# Patient Record
Sex: FEMALE | Race: WHITE | NOT HISPANIC OR LATINO
[De-identification: names, ages, dates, MRNs, and addresses within clinical notes are randomized per-mention and may not be internally consistent; named-entity substitution may affect disease eponyms.]

---

## 2024-11-07 ENCOUNTER — DASHBOARD ENCOUNTERS (OUTPATIENT)
Age: 74
End: 2024-11-07

## 2024-11-07 ENCOUNTER — TELEPHONE ENCOUNTER (OUTPATIENT)
Dept: URBAN - METROPOLITAN AREA CLINIC 46 | Facility: CLINIC | Age: 74
End: 2024-11-07

## 2024-11-07 ENCOUNTER — OFFICE VISIT (OUTPATIENT)
Dept: URBAN - METROPOLITAN AREA CLINIC 48 | Facility: CLINIC | Age: 74
End: 2024-11-07
Payer: MEDICARE

## 2024-11-07 VITALS
BODY MASS INDEX: 23.99 KG/M2 | WEIGHT: 119 LBS | TEMPERATURE: 98.1 F | HEIGHT: 59 IN | HEART RATE: 76 BPM | SYSTOLIC BLOOD PRESSURE: 115 MMHG | DIASTOLIC BLOOD PRESSURE: 73 MMHG

## 2024-11-07 DIAGNOSIS — D50.0 IRON DEFICIENCY ANEMIA DUE TO CHRONIC BLOOD LOSS: ICD-10-CM

## 2024-11-07 PROBLEM — 724556004: Status: ACTIVE | Noted: 2024-11-07

## 2024-11-07 PROCEDURE — 99204 OFFICE O/P NEW MOD 45 MIN: CPT | Performed by: PHYSICIAN ASSISTANT

## 2024-11-07 RX ORDER — ATORVASTATIN CALCIUM 20 MG/1
1 TABLET TABLET, FILM COATED ORAL ONCE A DAY
Qty: 30 TABLET | Status: ACTIVE | COMMUNITY

## 2024-11-07 RX ORDER — CARBIDOPA AND LEVODOPA 50; 200 MG/1; MG/1
1 TABLET AS NEEDED TABLET, EXTENDED RELEASE ORAL
Status: ACTIVE | COMMUNITY

## 2024-11-07 RX ORDER — FUROSEMIDE 40 MG/1
1 TABLET TABLET ORAL ONCE A DAY
Qty: 30 TABLET | Refills: 1 | Status: ACTIVE | COMMUNITY

## 2024-11-07 RX ORDER — POTASSIUM CHLORIDE EXTENDED-RELEASE 1500 MG/1
1 TABLET WITH FOOD TABLET ORAL ONCE A DAY
Qty: 30 TABLET | Refills: 2 | Status: ACTIVE | COMMUNITY

## 2024-11-07 RX ORDER — FERROUS SULFATE TAB EC 325 MG (65 MG FE EQUIVALENT) 325 (65 FE) MG
1 TABLET TABLET DELAYED RESPONSE ORAL
Status: ACTIVE | COMMUNITY

## 2024-11-07 RX ORDER — OMEPRAZOLE 20 MG/1
1 CAPSULE 1/2 TO 1 HOUR BEFORE MORNING MEAL CAPSULE, DELAYED RELEASE ORAL ONCE A DAY
Status: ACTIVE | COMMUNITY

## 2024-11-07 RX ORDER — LEVOTHYROXINE SODIUM 0.05 MG/1
1 TABLET IN THE MORNING ON AN EMPTY STOMACH TABLET ORAL ONCE A DAY
Qty: 30 TABLET | Status: ACTIVE | COMMUNITY

## 2024-11-07 RX ORDER — ALPRAZOLAM 0.5 MG/1
1 TABLET TABLET ORAL TWICE A DAY
Status: ACTIVE | COMMUNITY

## 2024-11-07 RX ORDER — CARBIDOPA AND LEVODOPA 25; 100 MG/1; MG/1
1 TABLET AS NEEDED TABLET ORAL
Status: ACTIVE | COMMUNITY

## 2024-11-07 RX ORDER — ACETAMINOPHEN 325 MG/1
1 TABLET AS NEEDED TABLET ORAL
Status: ACTIVE | COMMUNITY

## 2024-11-07 RX ORDER — PHENAZOPYRIDINE HYDROCHLORIDE 100 MG/1
2 TABLETS AFTER MEALS TABLET, FILM COATED ORAL THREE TIMES A DAY
Status: ACTIVE | COMMUNITY

## 2024-11-07 RX ORDER — GLUCOSAMINE HCL 500 MG
AS DIRECTED TABLET ORAL
Status: ACTIVE | COMMUNITY

## 2024-11-07 RX ORDER — METOPROLOL SUCCINATE 50 MG/1
1 TABLET TABLET, EXTENDED RELEASE ORAL ONCE A DAY
Qty: 30 TABLET | Refills: 1 | Status: ACTIVE | COMMUNITY

## 2024-11-07 RX ORDER — AMANTADINE HYDROCHLORIDE 100 MG/1
1 TABLET TABLET ORAL ONCE A DAY
Status: ACTIVE | COMMUNITY

## 2024-11-07 NOTE — HPI-TODAY'S VISIT:
73 y/o female is being referred by Dr. Bernal for evaluation of anemia. Labs 4/2023 showed a Hgb of 13.1. Labs 10/28/24 showed Hgb down to 6.8 with MCV of 68. CMP was unremarkable and with normal renal function (GFR 62 and Cr 0.98). She has h/o Parkinson's disease and is essentially immobile; she lives in a personal care home. Her son is with her today. She has recently been very fatigued and much less verbal, and she is not able to provide much history. Per her son, there has been no report over overt bleeding in the stool, urine (has suprapubic catheter due to issues with incontinence), or vaginal bleeding. She does not take any regular NSAIDs or anticogulants. She has not complained of abdominal pain or GI distress aside from some intermittent diarrhea. She reports that she last had a colonoscopy 5+ years ago by Dr. Villatoro. She saw Dr. Velia Morgan yesterday. She had type and cross done and is scheduled for blood transfusion tomorrow. She will start IV iron next week on Wednesday.

## 2024-11-14 ENCOUNTER — TELEPHONE ENCOUNTER (OUTPATIENT)
Dept: URBAN - METROPOLITAN AREA CLINIC 46 | Facility: CLINIC | Age: 74
End: 2024-11-14

## 2024-11-20 ENCOUNTER — OFFICE VISIT (OUTPATIENT)
Dept: URBAN - METROPOLITAN AREA MEDICAL CENTER 35 | Facility: MEDICAL CENTER | Age: 74
End: 2024-11-20

## 2024-11-27 ENCOUNTER — TELEPHONE ENCOUNTER (OUTPATIENT)
Dept: URBAN - METROPOLITAN AREA CLINIC 48 | Facility: CLINIC | Age: 74
End: 2024-11-27

## 2024-11-27 ENCOUNTER — LAB OUTSIDE AN ENCOUNTER (OUTPATIENT)
Dept: URBAN - METROPOLITAN AREA CLINIC 44 | Facility: CLINIC | Age: 74
End: 2024-11-27

## 2024-11-27 ENCOUNTER — TELEPHONE ENCOUNTER (OUTPATIENT)
Dept: URBAN - METROPOLITAN AREA CLINIC 44 | Facility: CLINIC | Age: 74
End: 2024-11-27

## 2024-11-27 ENCOUNTER — OFFICE VISIT (OUTPATIENT)
Dept: URBAN - METROPOLITAN AREA MEDICAL CENTER 35 | Facility: MEDICAL CENTER | Age: 74
End: 2024-11-27

## 2024-11-27 ENCOUNTER — OFFICE VISIT (OUTPATIENT)
Dept: URBAN - METROPOLITAN AREA CLINIC 44 | Facility: CLINIC | Age: 74
End: 2024-11-27

## 2024-11-27 RX ORDER — AMANTADINE HYDROCHLORIDE 100 MG/1
1 TABLET TABLET ORAL ONCE A DAY
Status: ACTIVE | COMMUNITY

## 2024-11-27 RX ORDER — OMEPRAZOLE 20 MG/1
1 CAPSULE 1/2 TO 1 HOUR BEFORE MORNING MEAL CAPSULE, DELAYED RELEASE ORAL ONCE A DAY
Status: ACTIVE | COMMUNITY

## 2024-11-27 RX ORDER — CARBIDOPA AND LEVODOPA 25; 100 MG/1; MG/1
1 TABLET AS NEEDED TABLET ORAL
Status: ACTIVE | COMMUNITY

## 2024-11-27 RX ORDER — ACETAMINOPHEN 325 MG/1
1 TABLET AS NEEDED TABLET ORAL
Status: ACTIVE | COMMUNITY

## 2024-11-27 RX ORDER — ATORVASTATIN CALCIUM 20 MG/1
1 TABLET TABLET, FILM COATED ORAL ONCE A DAY
Qty: 30 TABLET | Status: ACTIVE | COMMUNITY

## 2024-11-27 RX ORDER — GLUCOSAMINE HCL 500 MG
AS DIRECTED TABLET ORAL
Status: ACTIVE | COMMUNITY

## 2024-11-27 RX ORDER — LEVOTHYROXINE SODIUM 0.05 MG/1
1 TABLET IN THE MORNING ON AN EMPTY STOMACH TABLET ORAL ONCE A DAY
Qty: 30 TABLET | Status: ACTIVE | COMMUNITY

## 2024-11-27 RX ORDER — FUROSEMIDE 40 MG/1
1 TABLET TABLET ORAL ONCE A DAY
Qty: 30 TABLET | Refills: 1 | Status: ACTIVE | COMMUNITY

## 2024-11-27 RX ORDER — FERROUS SULFATE TAB EC 325 MG (65 MG FE EQUIVALENT) 325 (65 FE) MG
1 TABLET TABLET DELAYED RESPONSE ORAL
Status: ACTIVE | COMMUNITY

## 2024-11-27 RX ORDER — ALPRAZOLAM 0.5 MG/1
1 TABLET TABLET ORAL TWICE A DAY
Status: ACTIVE | COMMUNITY

## 2024-11-27 RX ORDER — PHENAZOPYRIDINE HYDROCHLORIDE 100 MG/1
2 TABLETS AFTER MEALS TABLET, FILM COATED ORAL THREE TIMES A DAY
Status: ACTIVE | COMMUNITY

## 2024-11-27 RX ORDER — POTASSIUM CHLORIDE EXTENDED-RELEASE 1500 MG/1
1 TABLET WITH FOOD TABLET ORAL ONCE A DAY
Qty: 30 TABLET | Refills: 2 | Status: ACTIVE | COMMUNITY

## 2024-11-27 RX ORDER — METOPROLOL SUCCINATE 50 MG/1
1 TABLET TABLET, EXTENDED RELEASE ORAL ONCE A DAY
Qty: 30 TABLET | Refills: 1 | Status: ACTIVE | COMMUNITY

## 2024-11-27 RX ORDER — CARBIDOPA AND LEVODOPA 50; 200 MG/1; MG/1
1 TABLET AS NEEDED TABLET, EXTENDED RELEASE ORAL
Status: ACTIVE | COMMUNITY

## 2024-12-03 ENCOUNTER — TELEPHONE ENCOUNTER (OUTPATIENT)
Dept: URBAN - METROPOLITAN AREA CLINIC 46 | Facility: CLINIC | Age: 74
End: 2024-12-03

## 2025-02-18 ENCOUNTER — OFFICE VISIT (OUTPATIENT)
Dept: URBAN - METROPOLITAN AREA CLINIC 44 | Facility: CLINIC | Age: 75
End: 2025-02-18